# Patient Record
Sex: FEMALE | Race: WHITE | NOT HISPANIC OR LATINO | Employment: UNEMPLOYED | URBAN - METROPOLITAN AREA
[De-identification: names, ages, dates, MRNs, and addresses within clinical notes are randomized per-mention and may not be internally consistent; named-entity substitution may affect disease eponyms.]

---

## 2021-07-27 ENCOUNTER — HOSPITAL ENCOUNTER (EMERGENCY)
Facility: CLINIC | Age: 26
Discharge: HOME | End: 2021-07-27
Attending: EMERGENCY MEDICINE | Admitting: EMERGENCY MEDICINE
Payer: COMMERCIAL

## 2021-07-27 ENCOUNTER — APPOINTMENT (OUTPATIENT)
Dept: GENERAL RADIOLOGY | Facility: CLINIC | Age: 26
End: 2021-07-27
Attending: EMERGENCY MEDICINE
Payer: COMMERCIAL

## 2021-07-27 VITALS
OXYGEN SATURATION: 98 % | RESPIRATION RATE: 18 BRPM | WEIGHT: 200 LBS | BODY MASS INDEX: 35.44 KG/M2 | HEART RATE: 90 BPM | DIASTOLIC BLOOD PRESSURE: 69 MMHG | HEIGHT: 63 IN | SYSTOLIC BLOOD PRESSURE: 106 MMHG | TEMPERATURE: 97.2 F

## 2021-07-27 DIAGNOSIS — F19.11 HISTORY OF SUBSTANCE ABUSE (H): ICD-10-CM

## 2021-07-27 DIAGNOSIS — R10.84 ABDOMINAL PAIN, GENERALIZED: ICD-10-CM

## 2021-07-27 PROCEDURE — 250N000013 HC RX MED GY IP 250 OP 250 PS 637: Performed by: EMERGENCY MEDICINE

## 2021-07-27 PROCEDURE — 99284 EMERGENCY DEPT VISIT MOD MDM: CPT | Performed by: EMERGENCY MEDICINE

## 2021-07-27 PROCEDURE — 74019 RADEX ABDOMEN 2 VIEWS: CPT

## 2021-07-27 PROCEDURE — 99283 EMERGENCY DEPT VISIT LOW MDM: CPT | Performed by: EMERGENCY MEDICINE

## 2021-07-27 RX ORDER — QUETIAPINE FUMARATE 50 MG/1
2 TABLET, FILM COATED ORAL DAILY
COMMUNITY
Start: 2021-07-20

## 2021-07-27 RX ORDER — METHADONE HYDROCHLORIDE 10 MG/ML
45 CONCENTRATE ORAL 2 TIMES DAILY
COMMUNITY

## 2021-07-27 RX ORDER — DIVALPROEX SODIUM 500 MG/1
500 TABLET, DELAYED RELEASE ORAL 2 TIMES DAILY
COMMUNITY

## 2021-07-27 RX ORDER — TOPIRAMATE 100 MG/1
1 TABLET, FILM COATED ORAL DAILY
COMMUNITY
Start: 2021-07-26

## 2021-07-27 RX ORDER — LANCETS
EACH MISCELLANEOUS 2 TIMES DAILY
COMMUNITY
Start: 2021-07-20

## 2021-07-27 RX ADMIN — MAGNESIUM HYDROXIDE 30 ML: 400 SUSPENSION ORAL at 10:32

## 2021-07-27 ASSESSMENT — ENCOUNTER SYMPTOMS
RESPIRATORY NEGATIVE: 1
PSYCHIATRIC NEGATIVE: 1
ALLERGIC/IMMUNOLOGIC NEGATIVE: 1
MUSCULOSKELETAL NEGATIVE: 1
CONSTITUTIONAL NEGATIVE: 1
NEUROLOGICAL NEGATIVE: 1
EYES NEGATIVE: 1
ENDOCRINE NEGATIVE: 1
CARDIOVASCULAR NEGATIVE: 1
ABDOMINAL PAIN: 1
CONSTIPATION: 1
HEMATOLOGIC/LYMPHATIC NEGATIVE: 1

## 2021-07-27 ASSESSMENT — MIFFLIN-ST. JEOR: SCORE: 1616.32

## 2021-07-27 NOTE — DISCHARGE INSTRUCTIONS
1) Your evaluation today revealed that you have constipation.  To help your symptoms I recommend you use 30 to 60 mL of MiraLAX daily over the next 3 days.  May continue to use MiraLAX Daily or Twice a day and consider using stool softeners as you have been using.  If you develop diarrhea while on this regimen you may cut back your MiraLAX from daily to 3 times a week or every other day.    2) we do not have good stool output in the next 2 to 3 days consider using half a bottle of magnesium citrate it is also helpful to eat fiber.     3) if you have fever develop bloody stools abdominal distention or new concerns you may need to return to be evaluated.    4) Best of luck with treatment.  Be sure to lie the client to rest during her daily scheduled activities.  A break every 2 hours will be beneficial

## 2021-07-27 NOTE — ED NOTES
Issues with constipation due to methadone. LBM 1 week ago. Tried stool softnres on Sunday and yesterday x6 also took miralax last night. No results. Has had issues in past.  General abd pain. Increase with palpation.  abd soft. Denies rectal pain. No nausea. No fever

## 2021-07-27 NOTE — ED PROVIDER NOTES
History     Chief Complaint   Patient presents with     Constipation     on methadone, constipated. LBM 1 week ago. tried miralax, and stool softners x6  without results     Abdominal Pain     HPI  Cassie Hester is a 26 year old female who presents for evaluation with abdominal pain and concern about constipation.  On examination patient reports she is currently at [facility]-for treatment for history of heroin addiction.  She arrived at [facility] about a week ago.  She is originally from [facility].  Patient reports her last heroin use was 2 weeks earlier.  Patient has an IUD in place and has not had a menstrual period for about 3 years.  She is currently prescribed methadone 45 mg twice a day, Effexor, divalproex, Metformin, Topamax, Seroquel.  Patient reports an allergy to Duricef and Ceftin.  Patient reports she has not had a bowel movement for about a week.  She reports she has tried MiraLAX over the last 2 days and a total of 12 tablets of stool softeners without significant output. Patient reports prior history of constipation in the past and reports prior abdominal surgeries including appendectomy in second grade,  in 2014, and cholecystectomy in .    Allergies:  Allergies   Allergen Reactions     Ceftin [Cefuroxime] Rash     Duricef [Cefadroxil] Rash       Problem List:    There are no problems to display for this patient.       Past Medical History:    No past medical history on file.    Past Surgical History:    No past surgical history on file.    Family History:    No family history on file.    Social History:  Marital Status:  Single [1]  Social History     Tobacco Use     Smoking status: Not on file   Substance Use Topics     Alcohol use: Not on file     Drug use: Not on file        Medications:    CONTOUR NEXT TEST test strip  divalproex sodium delayed-release (DEPAKOTE) 500 MG DR tablet  metFORMIN (GLUCOPHAGE) 1000 MG tablet  methadone  "(DOLOPHINE-INTENSOL) 10 MG/ML (HIGH CONC) solution  Microlet Lancets MISC  QUEtiapine (SEROQUEL) 50 MG tablet  topiramate (TOPAMAX) 100 MG tablet  Venlafaxine HCl (EFFEXOR PO)          Review of Systems   Constitutional: Negative.    HENT: Negative.    Eyes: Negative.    Respiratory: Negative.    Cardiovascular: Negative.    Gastrointestinal: Positive for abdominal pain and constipation.   Endocrine: Negative.    Genitourinary: Negative.    Musculoskeletal: Negative.    Skin: Negative.    Allergic/Immunologic: Negative.    Neurological: Negative.    Hematological: Negative.    Psychiatric/Behavioral: Negative.    All other systems reviewed and are negative.      Physical Exam   BP: 106/69  Pulse: 90  Temp: 97.2  F (36.2  C)  Resp: 18  Height: 160 cm (5' 3\")  Weight: 90.7 kg (200 lb)  SpO2: 98 %      Physical Exam  Constitutional:       General: She is not in acute distress.     Appearance: She is well-developed. She is not ill-appearing, toxic-appearing or diaphoretic.   HENT:      Head: Normocephalic and atraumatic.   Eyes:      Extraocular Movements: Extraocular movements intact.      Pupils: Pupils are equal, round, and reactive to light.   Cardiovascular:      Rate and Rhythm: Normal rate and regular rhythm.   Pulmonary:      Effort: Pulmonary effort is normal. No respiratory distress.      Breath sounds: Normal breath sounds. No stridor. No wheezing, rhonchi or rales.   Chest:      Chest wall: No tenderness.   Abdominal:      General: Abdomen is protuberant. Bowel sounds are decreased.   Skin:     Capillary Refill: Capillary refill takes less than 2 seconds.      Coloration: Skin is not cyanotic, jaundiced, mottled or pale.      Findings: No erythema or rash.   Neurological:      General: No focal deficit present.      Mental Status: She is alert and oriented to person, place, and time.         ED Course        Procedures              Critical Care time:  none               ED medications:  Medications " "  magnesium hydroxide (MILK OF MAGNESIA) suspension 30 mL (30 mLs Oral Given 21 1032)         ED vitals;  Vitals:    21 0844   BP: 106/69   Pulse: 90   Resp: 18   Temp: 97.2  F (36.2  C)   TempSrc: Temporal   SpO2: 98%   Weight: 90.7 kg (200 lb)   Height: 1.6 m (5' 3\")       ED labs and imaging:  Results for orders placed or performed during the hospital encounter of 21   Abdomen, flat/upright (2 views)     Status: None (Preliminary result)    Narrative    ABDOMEN TWO VIEWS  2021 11:04 AM     HISTORY: Abdominal pain. Constipation.    COMPARISON: None.      Impression    IMPRESSION: Moderate amount of stool throughout the colon. Bowel gas  pattern is otherwise within normal limits. No convincing radiographic  evidence for bowel obstruction. No free intraperitoneal air. Surgical  clips RUQ.         Assessments & Plan (with Medical Decision Making)   Assessment Summary and Clinical Impression: 26-year-old female who presented with concern about constipation with minimal stool output over the last week. Patient reported no relief despite trying a bowel regimen with MiraLAX and stool softeners.  History of heroin addiction currently on methadone.  Last use was reported to be 2 weeks prior.  Currently at [facility]( 90-day program).  Afebrile,  Pleasant, in no acute distress.  Patient reported a history of appendectomy, cholecystectomy, and .  No fever or chills.  No back pain or flank pain no urinary symptoms.  IUD in place and no menstrual period for about 3 years (reported). Patient had quite bowel sounds throughout with a protuberant abdomen but no tenderness to palpation in all quadrants.  Imaging obtained to evaluate for stool burden and to exclude obstructive bowel gas process. Modest stool noted. Patient is discharged with trial of outpatient with low threshold to return for re-evaluation.    ED course and Plan:  We discussed and reviewed causes for constipation.  She inquired " if her prescribed medications could be contributing to her constipation. Patient reported no new medication changes.  X imaging was reviewed independently and the radiology report was also reviewed.  She was offered milk of magnesia.  X imaging was reviewed independently and the radiology report reviewed.  No bowel obstruction appreciated.  Moderate stool in the colon.  We discussed a bowel regimen for home including considering using half a cap of Miralax twice a day, fleets enema, and scheduled milk of magnesia daily for the next 3 days.  Suggested she may considerusing magnesium citrate if no significant stool output over the next 5 days. We reviewed worrisome symptoms including but not limited to fever, vomiting. Patient expressed comfort, understanding and agreement with the plan of care.    Disclaimer: This note consists of symbols derived from keyboarding, dictation and/or voice recognition software. As a result, there may be errors in the script that have gone undetected. Please consider this when interpreting information found in this chart.  I have reviewed the nursing notes.    I have reviewed the findings, diagnosis, plan and need for follow up with the patient.       New Prescriptions    No medications on file       Final diagnoses:   Abdominal pain, generalized - Discomfort likely related to constipation with no bowel movement for 1 week       7/27/2021   [facility] EMERGENCY DEPT     Alvin Beth MD  07/27/21 1411

## 2021-07-29 ENCOUNTER — HOSPITAL ENCOUNTER (EMERGENCY)
Facility: CLINIC | Age: 26
Discharge: HOME | End: 2021-07-29
Attending: FAMILY MEDICINE | Admitting: FAMILY MEDICINE
Payer: COMMERCIAL

## 2021-07-29 VITALS
BODY MASS INDEX: 35.43 KG/M2 | WEIGHT: 200 LBS | SYSTOLIC BLOOD PRESSURE: 100 MMHG | TEMPERATURE: 97.9 F | HEART RATE: 100 BPM | DIASTOLIC BLOOD PRESSURE: 67 MMHG | OXYGEN SATURATION: 99 %

## 2021-07-29 DIAGNOSIS — K59.03 DRUG-INDUCED CONSTIPATION: ICD-10-CM

## 2021-07-29 PROCEDURE — 99284 EMERGENCY DEPT VISIT MOD MDM: CPT | Performed by: FAMILY MEDICINE

## 2021-07-29 PROCEDURE — 99283 EMERGENCY DEPT VISIT LOW MDM: CPT | Performed by: FAMILY MEDICINE

## 2021-07-29 NOTE — ED NOTES
Pt had one small loose BM prior to the enema. She has had 2 BM since the enema medium amount. Pt reports she's feeling a little better. Stomach is still growling.

## 2021-07-29 NOTE — ED PROVIDER NOTES
History     Chief Complaint   Patient presents with     Constipation     HPI  Cassie Hester is a 26 year old female who presents with constipation. She has not had a proper bowel movement in 9 days. She is being treated for opiate addiction at [facility]. She is on methadone medication assisted therapy. She has tried MiraLAX, stool softeners, magnesium citrate, milk of magnesia. None of this has affected a bowel movement. She was seen for this 2 days ago here. He had an x-ray that showed considerable stool burden in the colon. She is not having fever or vomiting. She is not having any irritative voiding symptoms. Had a prior cholecystectomy and appendectomy.    Allergies:  Allergies   Allergen Reactions     Ceftin [Cefuroxime] Rash     Duricef [Cefadroxil] Rash       Problem List:    There are no problems to display for this patient.       Past Medical History:    No past medical history on file.    Past Surgical History:    No past surgical history on file.    Family History:    No family history on file.    Social History:  Marital Status:  Single [1]  Social History     Tobacco Use     Smoking status: Not on file   Substance Use Topics     Alcohol use: Not on file     Drug use: Not on file        Medications:    lactulose (CEPHULAC) 20 GM packet  CONTOUR NEXT TEST test strip  divalproex sodium delayed-release (DEPAKOTE) 500 MG DR tablet  metFORMIN (GLUCOPHAGE) 1000 MG tablet  methadone (DOLOPHINE-INTENSOL) 10 MG/ML (HIGH CONC) solution  Microlet Lancets MISC  QUEtiapine (SEROQUEL) 50 MG tablet  topiramate (TOPAMAX) 100 MG tablet  Venlafaxine HCl (EFFEXOR PO)      Review of Systems    All other systems are reviewed and are negative    Physical Exam   BP: 100/67  Pulse: 100  Temp: 97.9  F (36.6  C)  Weight: 90.7 kg (200 lb)  SpO2: 99 %      Physical Exam    Nursing note and vitals were reviewed.  Constitutional: Awake and alert, adequately nourished and developed appearing 26-year-old in no apparent  discomfort, who does not appear acutely ill, and who answers questions appropriately and cooperates with examination.  HEENT: EOMI.   Neck: Freely mobile.  Pulmonary/Chest: Breathing is unlabored.   Abdomen: Soft, mild diffuse tenderness, no HSM or masses rebound or guarding.  Neurological: Alert, oriented, thought content logical, coherent   Skin: Warm, dry, no rashes.  Psychiatric: Affect broad and appropriate.      ED Course        Procedures              Critical Care time:  none               No results found for this or any previous visit (from the past 24 hour(s)).    Medications - No data to display    Assessments & Plan (with Medical Decision Making)     26-year-old female presented with constipation in the setting of methadone use for medication assisted therapy for opiate dependence.  Details above.  She received an enema in the ED with modest results.  She will continue bowel cleansing with lactulose.  She will begin prevention once bowel cleansing is achieved with docusate.  She should return if she develops pain, fever, vomiting, or other significant symptoms.    I have reviewed the nursing notes.    I have reviewed the findings, diagnosis, plan and need for follow up with the patient.       Discharge Medication List as of 7/29/2021  1:43 PM      START taking these medications    Details   lactulose (CEPHULAC) 20 GM packet Take 1 packet (20 g) by mouth 2 times daily as needed for constipation, Disp-6 packet, R-0, E-Prescribe             Final diagnoses:   Drug-induced constipation       7/29/2021   [facility] EMERGENCY DEPT     Sanju Batista MD  07/29/21 2055

## 2021-07-29 NOTE — DISCHARGE INSTRUCTIONS
Take lactulose 20 g today when you are home.  Repeat in 6 hours if no results.  Once you have cleansed your bowels, begin taking preventative medication using docusate sodium 100 mg twice daily.

## 2021-07-29 NOTE — ED NOTES
Pt presents to ED with concerns of constipation for 9 days now. States she has tried multiple rounds of miralax, mag citrate and several other stool softeners with minimal/no success.